# Patient Record
Sex: MALE | Race: OTHER | HISPANIC OR LATINO | URBAN - METROPOLITAN AREA
[De-identification: names, ages, dates, MRNs, and addresses within clinical notes are randomized per-mention and may not be internally consistent; named-entity substitution may affect disease eponyms.]

---

## 2024-07-20 ENCOUNTER — EMERGENCY (EMERGENCY)
Facility: HOSPITAL | Age: 89
LOS: 1 days | Discharge: HOME | End: 2024-07-20
Attending: STUDENT IN AN ORGANIZED HEALTH CARE EDUCATION/TRAINING PROGRAM
Payer: SELF-PAY

## 2024-07-20 VITALS
DIASTOLIC BLOOD PRESSURE: 78 MMHG | TEMPERATURE: 98 F | SYSTOLIC BLOOD PRESSURE: 128 MMHG | HEART RATE: 82 BPM | RESPIRATION RATE: 20 BRPM | OXYGEN SATURATION: 94 %

## 2024-07-20 VITALS
DIASTOLIC BLOOD PRESSURE: 80 MMHG | HEIGHT: 63.39 IN | SYSTOLIC BLOOD PRESSURE: 125 MMHG | WEIGHT: 167.55 LBS | RESPIRATION RATE: 20 BRPM | HEART RATE: 98 BPM | OXYGEN SATURATION: 92 % | TEMPERATURE: 98 F

## 2024-07-20 PROCEDURE — 99282 EMERGENCY DEPT VISIT SF MDM: CPT

## 2024-07-20 PROCEDURE — 99284 EMERGENCY DEPT VISIT MOD MDM: CPT

## 2024-07-20 PROCEDURE — 99053 MED SERV 10PM-8AM 24 HR FAC: CPT

## 2024-07-20 NOTE — ED ADULT NURSE NOTE - CHIEF COMPLAINT QUOTE
pt states "I came [place] 2 months ago, I got lost today, can't find my way home", found on street at the corner of [facility] and [facility]. [employer] called as per EMS, denies any chest pain, no other complaints at this time

## 2024-07-20 NOTE — ED ADULT TRIAGE NOTE - CHIEF COMPLAINT QUOTE
pt states "I got lost, can't find my way home", found on street at the corner of [facility] and [facility]. [employer] called as per EMS, denies any chest pain, no other complaints at this time pt states "I came from [place] to [place] family 2 months ago, I got lost today, can't find my way home", found on street at the corner of [facility] and [facility]. [employer] called as per EMS, denies any chest pain, no other complaints at this time

## 2024-07-20 NOTE — ED ADULT NURSE NOTE - HIV OFFER
Problem: At Risk for Falls  Goal: # Patient does not fall  Outcome: Outcome Met, Continue evaluating goal progress toward completion  Note: Ensure bed/chair alarm in place. Purposeful rounding done to ensure needs are met. Reminded patient to call staff with needs. Ensure all belongings within reach at all times.      Problem: Pressure Injury, Risk for  Goal: # Skin remains intact  Outcome: Outcome Met, Continue evaluating goal progress toward completion  Note: No new skin issue noted. Patient able to reposition independently. Continue to encourage patient to shift weight/position while up in chair or bed. Ensure patient remain clean and dry. Toileting patient [id number] for incontinence.       Opt out

## 2024-07-20 NOTE — ED PROVIDER NOTE - NSFOLLOWUPINSTRUCTIONS_ED_ALL_ED_FT
Visita de bienestar para los adultos    LO QUE NECESITA SABER:    ¿Qué es lenard visita de bienestar?Lenard visita de bienestar es cuando usted acude con un médico para que le roberta exámenes de detección de problemas de kecia. Canales médico también le dará consejos sobre cómo mantenerse saludable. Anote rossana preguntas para que se acuerde de hacerlas. Pregunte a canales médico con qué frecuencia debería realizarse lenard visita de bienestar.    ¿Qué sucede en lenard visita de bienestar?Canales médico le preguntará sobre canales kecia y canales historia familiar de problemas de kecia. [place] incluye presión arterial (PA) mary, enfermedades del corazón y cáncer. El médico le preguntará si tiene síntomas que le preocupen, si fuma y sobre canales estado de ánimo. También es posible que se le pregunte por cualquier medicamento, suplemento, producto del tabaco o consumo de alcohol.    Se realizará un examen físico general.El médico comprobará rossana latidos del corazón y los pulmones con un estetoscopio. También es posible que le examinen la piel para detectar lesiones ace o signos tempranos de cáncer de piel.    Se controlará canales peso.Es posible que también le midan canales altura para calcular canales índice de masa corporal (IMC). El IMC se muestra en forma de rango. Canales médico puede decirle si canales IMC está en un rango saludable.    Se controlará canales PA.Canales médico puede darle instrucciones para reducir canales PA si es más mary de lo saludable.  Lecturas de la presión arterial      Es posible que se realicen exámenes de monae y de orina.Se podrían realizar exámenes de monae para revisar los niveles de colesterol. Los niveles anormales de colesterol aumentan el riesgo de enfermedad del corazón y accidente cerebrovascular. Puede que también necesite lenard prueba de monae u orina para revisar si tiene diabetes si usted está en mayor riesgo. Las pruebas de orina pueden hacerse para buscar signos de lenard infección o lenard enfermedad renal.  ¿Qué pruebas de detección podría necesitar?Se realiza lenard detección para buscar enfermedades o afecciones que pueden no causar síntomas. Los exámenes de detección que necesite dependen de canales edad, género, antecedentes familiares y estilo de rhett. A continuación se ofrecen ejemplos de pruebas de detección habituales:    La detección del cáncer colorrectalse recomienda a partir de los 50 años y hasta los 75 si se tiene un riesgo promedio. Las pruebas de detección pueden comenzar antes de los 50 años o continuar después de los 75 si canales riesgo es alto. Es posible que necesite hacerse las pruebas de detección cada 1, 2, 5 o 10 años. El momento depende del tipo de detección y si se encontraron pólipos u otros problemas. El momento también depende de canales edad y de si usted tiene mayor riesgo de tener cáncer.    La detección del cáncer de mamasuele hacerse con lenard mamografía. Lenard mamografía es lenard radiografía de rossana mamas para detectar cáncer de mama. Las mamografías suelen recomendarse a las mujeres a partir de los 45 o 50 años. Canales médico podría recomendarle que empiece a los 40 años o menos si canales riesgo de cáncer de mama es alto. Las mamografías suelen continuar cada 1 o 2 años hasta los 74 años. Es posible que canales médico le recomiende lenard ecografía si tiene tejido mamario denso.    La detección del cáncer cervicalincluye un Papanicolaou para detectar células cancerosas o precancerosas en el emma uterino, la vulva o la vagina. Las pruebas de Papanicolaou generalmente comienzan a los 21 años y continúan cada 3 a 5 años hasta los 65 años. Puede necesitarla más a menudo si usted ha tenido resultados anormales de la prueba de Papanicolaou.    Las pruebas de detección para la kecia mentalse utiliza para comprobar si hay depresión o pensamientos suicidas. La detección puede ayudarle a saber si corre un mayor riesgo. Puede aprender a reconocer los síntomas a tiempo para obtener ayuda rápidamente.  ¿Qué weston saber acerca de las vacunas?Las vacunas ayudan a prevenir ciertas enfermedades causadas por bacterias o virus. Hable con canales médico acera de estas u otras vacunas que usted podría necesitar:    Reciba la vacuna contra la gripetan pronto joyce se la recomienden cada año, generalmente en septiembre u octubre. Varios tipos de virus causan la influenza. Debido a que los virus cambian con el tiempo, es necesaria la producción de nuevas vacunas cada año.    La vacuna contra la COVID-19y todos los refuerzos recomendados. El virus que causa la COVID-19 sigue cambiando. Los refuerzos ayudan a protegerse frente a nuevas formas del virus, denominadas variantes. Canales médico puede indicarle cuándo debe vacunarse y las dosis de refuerzo actualizadas.    Debe recibir lenard vacuna de refuerzo contra el tétanos-difteria (Td)cada 10 años. El tétanos es lenard infección severa que puede causar trismo y espasmos musculares dolorosos. La difteria es lenard infección bacteriana grave que produce lenard cubierta gruesa en la parte de atrás de la boca y garganta.    Debe recibir la vacuna contra el virus del papiloma humano (VPH)si usted tiene entre 19 y 26 años y nunca la recibió. El virus del papiloma humano o VPH es la infección más común que se transmite por contacto sexual. El virus del papiloma humano también podría provocar cáncer vaginal, del pene y del ano.    Debe recibir la vacuna antineumocócicasi tiene más de 65 años. La enfermedad neumocócica es lenard infección causada por la bacteria neumocócica. La infección puede causar neumonía, meningitis o lenard infección del oído.    Vacúnese contra la culebrilla (virus de varicela-zóster)Si tiene 60 años o más, incluso si ha tenido culebrilla antes. El virus que causa la culebrilla es el mismo que causa la varicela. La culebrilla es un sarpullido doloroso que se desarrolla en personas que tuvieron varicela o vegas estado expuestas al virus.  ¿Cuáles son algunas pautas de nutrición saludables?[place] es un modelo para planear comidas sanas. Muestra los tipos y cantidades de alimentos que deberían ir en un plato. Las frutas y verduras representan alrededor de la mitad de canales plato y los granos y proteínas representan la otra mitad. Se incluye lenard porción de productos lácteos al lado del plato. La cantidad de calorías y el tamaño de las porciones que usted necesita dependen de canales edad, género, peso y altura. Los ejemplos de alimentos saludables son:    Consuma lenard variedad de verduras, joyce las de color brandon oscuro, gallegos y naranja. Usted también puede incluir verduras enlatadas bajas en sodio (sal) y verduras congeladas sin mantequilla ni salsas añadidas.    Consuma lenard variedad de fruta frescas, frutas enlatadas en 100% de jugo, fruta congelada y carmen secos.    Incluya granos enteros, joyce panes de grano entero, pasta integral, arroz integral y cereales de grano entero joyce la nura. Por lo menos la mitad de los granos que usted consume deben ser granos de aminta integral.    Consuma lenard variedad de alimentos con proteínas, joyce mariscos (pescado y crustáceos), carne magra y carne de ave (pavo y dionna) sin piel. Ejemplos de malai magras incluyen pierna, paleta o lomo de puerco y dieter, solomillo o, lomo de res y carne molida de res extra magra. Otros alimentos ricos en proteínas son los huevos y sustitutos de huevo, frijoles, chícharos, productos de soya, nueces y semillas.    Elija alimentos con alto contenido de grasas saludables para el corazón, joyce los productos lácteos descremados o al 1%, los carmen secos, el salmón y el aceite de neves.    Limite las grasas poco saludables,, joyce la mantequilla, la margarina dura, la manteca y las grasas saturadas. Las grasas saturadas se encuentran en alimentos joyce la carne, los productos lácteos enteros y determinados aceites, joyce el de jp y el de jauregui.  Elige mi plato  ¿Cuáles son algunas pautas de actividad física?Trate de hacer al menos 30 minutos de actividad física la mayoría de los días de la semana. Algunos ejemplos de actividad física incluyen caminar, montar en bicicleta, bailar y nadar. Usted también puede realizar más actividad física usando las escaleras en vez de los ascensores o estacionarse más lejos cuando vaya a las tiendas. Incluya ejercicios para fortalecer los músculos 2 días a la semana. El ejercicio regular proporciona muchos beneficios para la kecia. Le ayuda a controlar canales peso y disminuye el riesgo de diabetes tipo 2, presión arterial mary, enfermedad del corazón y accidente cerebrovascular. El ejercicio también le puede ayudar a mejorar canales estado de ánimo. Pregunte a canales médico acerca del mejor plan de ejercicio para usted.  Formas de ser físicamente activo  Entrenamiento de fuerza para adultos    ¿Cuáles son algunas pautas generales de kecia y seguridad?    No fume.La nicotina y otras sustancias químicas que contienen los cigarrillos y cigarros pueden dañar los pulmones. Pida información a canales médico si usted actualmente fuma y necesita ayuda para dejar de fumar. Los cigarrillos electrónicos o el tabaco sin humo igualmente contienen nicotina. Consulte con canaels odontólogo antes de utilizar estos productos.    Limite el consumo de alcohol.Un trago equivale a 12 onzas de cerveza, 5 onzas de vino o 1 onza y ½ de licor. Canales médico puede decirle cuántas bebidas puede eugene en 24 horas y en 7 días.    Mantenga un peso saludable.Pregúntele a canales médico cuál es el peso ideal para usted. El sobrepeso o la obesidad aumentan el riesgo de padecer determinadas enfermedades. Estos incluyen enfermedad del corazón, presión arterial mary, diabetes tipo 2 y ciertos tipos de cáncer.    Proteja canales piel.No tome el sol ni use eric de bronceado. Use un protector solar con un FPS mayor a 15. Aplíquese el bloqueador por lo menos 15 minutos antes de que vaya a estar al aire ching. Vuelva a aplicarse la crema solar cada 2 horas. Use ropa protectora, sombrero y lentes para el sol cuando se encuentre afuera.  Prevención de daños del sol      Conduzca con seguridad.Use siempre canales cinturón de seguridad. Asegúrese que todos en el bette usan el cinturón de seguridad. Un cinturón de seguridad puede salvar canales rhett en leena de un accidente automovilístico. No use el celular cuando esté manejando. [place] puede hacer que se distraiga y causar un accidente. Es mejor que pare y se orille si necesita hacer lenard llamada o ngoc un texto.    Practique el sexo seguro.Use preservativos de látex para protegerse contra el embarazo o las infecciones de transmisión sexual (ITS). El médico puede recomendar pruebas de detección de determinadas ITS.    Use un mateo, un chaleco salvavidas y unos implementos de protección.Siempre use un mateo al montar en bicicleta o motocicleta, esquiar o jugar deportes que podrían causar lenard lesión en la jeffrey. Use implementos de protección cuando practique deportes. Use un chaleco salvavidas cuando esté en un bote o practicando actividades acuáticas.  ACUERDOS SOBRE CANALES CUIDADO:    Usted tiene el derecho de ayudar a planear canales cuidado. Aprenda todo lo que pueda sobre canales condición y joyce darle tratamiento. Discuta rossana opciones de tratamiento con rossana médicos para decidir el cuidado que usted desea recibir. Usted siempre tiene el derecho de rechazar el tratamiento.

## 2024-07-20 NOTE — ED PROVIDER NOTE - OBJECTIVE STATEMENT
88-year-old brought in by EMS found wandering the street unable to find his way home patient endorses feeling well denies any complaints at this time

## 2024-07-20 NOTE — ED PROVIDER NOTE - CLINICAL SUMMARY MEDICAL DECISION MAKING FREE TEXT BOX
patient presented with wandering otherwise clinically well no injury patient son came to ED endorses that he does not speak English and asked why he asked for help and appeared loss of otherwise patient appears baseline denies any injury clinically stable instructed follow PMD return precaution instructed patient well-appearing discharge. discharged with family

## 2024-07-20 NOTE — ED PROVIDER NOTE - PATIENT PORTAL LINK FT
You can access the FollowMyHealth Patient Portal offered by [facility] by registering at the following website: [email or web address]. By joining [employer]’s FollowMyHealth portal, you will also be able to view your health information using other applications (apps) compatible with our system.